# Patient Record
(demographics unavailable — no encounter records)

---

## 2024-10-28 NOTE — PHYSICAL EXAM
[Alert] : alert [Oriented x 3] : ~L oriented x 3 [Well Nourished] : well nourished [Full Body Skin Exam Performed] : performed [FreeTextEntry3] : A full skin exam was performed including the scalp, face, neck, chest, abdomen, back, buttocks, upper extremities and lower extremities.  The patient declined examination of the breasts and genitalia.   The exam did show the following benign growths: [place] pigmented nevi - all homogeneous and regular, small. Lentigines.  Hyperpigmented patches, bilateral forearms.  Scalp is doing well, with scarring alopecia, right anterior scalp with good hair growth surrounding.

## 2024-10-28 NOTE — ASSESSMENT
[FreeTextEntry1] : A complete skin examination was performed.  There is no evidence of skin cancer.  We discussed the importance of photoprotection, including the use of hats, protective clothing and sunscreens with an SPF of at least 30.  Sun avoidance was also discussed.  The ABCDE's of melanoma was discussed.  Regular skin exams recommended.  P.I. hyperpigmentation Education.  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

## 2024-10-28 NOTE — HISTORY OF PRESENT ILLNESS
[FreeTextEntry1] : Patient presents for skin examination. [de-identified] : Denies new, changing, bleeding or tender lesions on the skin over the past few months.

## 2025-01-15 NOTE — HISTORY OF PRESENT ILLNESS
[FreeTextEntry1] : Ms. Parker is a 68-year-old s/p Robotic Assisted TLH-BSO, omentectomy, pelvic and para-aortic lymph node biopsies, peritoneal biopsies and cystoscopy on 7/12/16 for pT1a N0 M0 endometrioid uterine cancer (FIGO grade 1) and pT1a N0 M0 endometrioid adenocarcinoma with squamous differentiation arising in endometriotic cyst confined to the left ovary (FIGO grade 2).   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 Had imaging at [facility] [place], [place] 8/2108 with resolutions of lesions, brain MRI due December, 2019. Recent brain MRI 2021 also stable. Feeling well, returns for f/u. Recent repeat imaging at [facility] [place] indicated 2 new brain lesions. patient had local RT continued on her targeted treatment, currently on clinical trial, having many side effects, including lost weight she is excited her daughter had a baby, now 9 months old - however, daughter dx epithelioid sarcoma of the gluteal region, undergoing treatment.  During visit of 6/17/2020:  Had MRI at [facility] [place] negative for disease CT scan also nl per patient  at [facility] [place] 12, last month  Interval History: 9/11/2020 CT chest, abdomen, pelvis: - Unchanged right lung base previously and clustered right pulmonary nodules, probably infectious / inflammatory.  - Decreased subcentimeter right upper paratracheal node, probably reactive. - No evidence of metastasis or recurrence in the abdomen and pelvis.   Health Maintenance: Mammogram: 8/12/24 BI-RADS 1   Patient recently had brain imaging, overall stable however, some new punctations, having f/u imaging in 8 wks, with [facility] [place]. Overall feeling great, no new complaints.

## 2025-01-15 NOTE — DISCUSSION/SUMMARY
[FreeTextEntry1] : patient is clinically ANGELIKA support provided, daughter dx with new gluteal sarcoma, undergoing treatment patient is taking care of grandson overall herself feeling well, but stressed about her daughter continue surveillance f/u 1 year mammo/breast US ordered

## 2025-01-15 NOTE — PHYSICAL EXAM
[Chaperone Present] : A chaperone was present in the examining room during all aspects of the physical examination [83342] : A chaperone was present during the pelvic exam. [Absent] : Adnexa(ae): Absent [Normal] : Recto-Vaginal Exam: Normal [de-identified] : smooth vag cuff, no lesions noted [de-identified] : smooth rectovag septum, no cul de sac nodules [Fully active, able to carry on all pre-disease performance without restriction] : Status 0 - Fully active, able to carry on all pre-disease performance without restriction

## 2025-01-15 NOTE — PHYSICAL EXAM
[Chaperone Present] : A chaperone was present in the examining room during all aspects of the physical examination [18986] : A chaperone was present during the pelvic exam. [Absent] : Adnexa(ae): Absent [Normal] : Recto-Vaginal Exam: Normal [de-identified] : smooth vag cuff, no lesions noted [de-identified] : smooth rectovag septum, no cul de sac nodules [Fully active, able to carry on all pre-disease performance without restriction] : Status 0 - Fully active, able to carry on all pre-disease performance without restriction

## 2025-01-15 NOTE — REASON FOR VISIT
[FreeTextEntry1] : Cancer surveillance follow up for a stage 1a g1 endometrioid uterine cancer and T1a G2 ovarian ca.

## 2025-01-15 NOTE — HISTORY OF PRESENT ILLNESS
[FreeTextEntry1] : Ms. Parker is a 68-year-old s/p Robotic Assisted TLH-BSO, omentectomy, pelvic and para-aortic lymph node biopsies, peritoneal biopsies and cystoscopy on 7/12/16 for pT1a N0 M0 endometrioid uterine cancer (FIGO grade 1) and pT1a N0 M0 endometrioid adenocarcinoma with squamous differentiation arising in endometriotic cyst confined to the left ovary (FIGO grade 2).   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 Had imaging at [facility] [place] 8/2108 with resolutions of lesions, brain MRI due December, 2019. Recent brain MRI 2021 also stable. Feeling well, returns for f/u. Recent repeat imaging at [facility] [place] indicated 2 new brain lesions. patient had local RT continued on her targeted treatment, currently on clinical trial, having many side effects, including lost weight she is excited her daughter had a baby, now 9 months old - however, daughter dx epithelioid sarcoma of the gluteal region, undergoing treatment.  During visit of 6/17/2020:  Had MRI at [facility] [place] negative for disease CT scan also nl per patient  at [facility] [place] 12, last month  Interval History: 9/11/2020 CT chest, abdomen, pelvis: - Unchanged right lung base previously and clustered right pulmonary nodules, probably infectious / inflammatory.  - Decreased subcentimeter right upper paratracheal node, probably reactive. - No evidence of metastasis or recurrence in the abdomen and pelvis.   Health Maintenance: Mammogram: 8/12/24 BI-RADS 1   Patient recently had brain imaging, overall stable however, some new punctations, having f/u imaging in 8 wks, with [facility] [place]. Overall feeling great, no new complaints.

## 2025-01-28 NOTE — ASSESSMENT
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 Opzelura cream bid until the inflammation has resolved.  Seb derm DHS-Zinc shampoo  Thyromegaly, with brittle and course hair. Check TSH.  Plan TBSE in the spring.

## 2025-01-28 NOTE — HISTORY OF PRESENT ILLNESS
[FreeTextEntry1] : Check the right anterior scalp. [de-identified] : Improved, but with some scaling of the scalp.

## 2025-01-28 NOTE — PHYSICAL EXAM
[Alert] : alert [Oriented x 3] : ~L oriented x 3 [Well Nourished] : well nourished [FreeTextEntry3] : Mild erythematous scarring of the right frontal scalp/temporal region. Mild scale, scalp.  Dry hair, anterior > posterior. Mild thyromegaly - thin neck.

## 2025-04-30 NOTE — HISTORY OF PRESENT ILLNESS
[FreeTextEntry1] : Patient presents for skin examination. [de-identified] : Denies new, changing, bleeding or tender lesions on the skin over the past 4 months.

## 2025-04-30 NOTE — PHYSICAL EXAM
